# Patient Record
Sex: MALE | Race: BLACK OR AFRICAN AMERICAN | NOT HISPANIC OR LATINO | Employment: UNEMPLOYED | URBAN - METROPOLITAN AREA
[De-identification: names, ages, dates, MRNs, and addresses within clinical notes are randomized per-mention and may not be internally consistent; named-entity substitution may affect disease eponyms.]

---

## 2022-09-15 ENCOUNTER — OFFICE VISIT (OUTPATIENT)
Dept: PODIATRY | Facility: CLINIC | Age: 62
End: 2022-09-15
Payer: OTHER GOVERNMENT

## 2022-09-15 VITALS
HEART RATE: 73 BPM | SYSTOLIC BLOOD PRESSURE: 129 MMHG | WEIGHT: 237 LBS | DIASTOLIC BLOOD PRESSURE: 83 MMHG | HEIGHT: 70 IN | BODY MASS INDEX: 33.93 KG/M2

## 2022-09-15 DIAGNOSIS — M79.671 RIGHT FOOT PAIN: ICD-10-CM

## 2022-09-15 DIAGNOSIS — M72.2 PLANTAR FASCIITIS: Primary | ICD-10-CM

## 2022-09-15 DIAGNOSIS — M76.61 ACHILLES TENDINITIS OF RIGHT LOWER EXTREMITY: ICD-10-CM

## 2022-09-15 PROCEDURE — 99203 OFFICE O/P NEW LOW 30 MIN: CPT | Mod: S$GLB,,, | Performed by: PODIATRIST

## 2022-09-15 PROCEDURE — 99203 PR OFFICE/OUTPT VISIT, NEW, LEVL III, 30-44 MIN: ICD-10-PCS | Mod: S$GLB,,, | Performed by: PODIATRIST

## 2022-09-15 RX ORDER — SERTRALINE HYDROCHLORIDE 100 MG/1
150 TABLET, FILM COATED ORAL
COMMUNITY
Start: 2022-08-22

## 2022-09-15 RX ORDER — ATORVASTATIN CALCIUM 20 MG/1
10 TABLET, FILM COATED ORAL
COMMUNITY
Start: 2022-08-22

## 2022-09-15 RX ORDER — AMLODIPINE BESYLATE 10 MG/1
10 TABLET ORAL
COMMUNITY
Start: 2022-08-22

## 2022-09-15 RX ORDER — CHOLECALCIFEROL (VITAMIN D3) 25 MCG
25 TABLET ORAL
COMMUNITY
Start: 2022-08-22

## 2022-09-15 RX ORDER — POTASSIUM CITRATE 5 MEQ/1
10 TABLET, EXTENDED RELEASE ORAL
COMMUNITY
Start: 2022-08-22

## 2022-09-15 RX ORDER — CHLORHEXIDINE GLUCONATE ORAL RINSE 1.2 MG/ML
SOLUTION DENTAL
COMMUNITY
Start: 2022-01-03

## 2022-09-15 RX ORDER — SILDENAFIL 100 MG/1
100 TABLET, FILM COATED ORAL
COMMUNITY
Start: 2022-08-22

## 2022-09-15 RX ORDER — HYDROCHLOROTHIAZIDE 12.5 MG/1
2 CAPSULE ORAL DAILY
COMMUNITY
Start: 2022-08-22

## 2022-09-15 RX ORDER — ALLOPURINOL 100 MG/1
1 TABLET ORAL DAILY
COMMUNITY
Start: 2022-08-22

## 2022-09-15 RX ORDER — PRAZOSIN HYDROCHLORIDE 2 MG/1
4 CAPSULE ORAL
COMMUNITY
Start: 2022-08-22

## 2022-09-21 NOTE — PROGRESS NOTES
Subjective:      Patient ID: Oscar Last is a 62 y.o. male.    Chief Complaint: Ankle Pain and Foot Pain    Oscar is a 62 y.o. male who presents to the clinic complaining of heel pain in the right foot, especially with the first step in the morning. The pain is described as Throbbing, Grabbing, Tight, and Sharp. The onset of the pain was sudden and has slightly improved over the past several months. Oscar rates the pain as 2/10. He reports a history of trauma.  Patient states pain began about 6 8 months ago after jumping off the back of a pickup truck.  Prior treatments include rest and PTC analgesics.    Review of Systems   Constitutional: Negative for chills and fever.   Cardiovascular:  Negative for chest pain and leg swelling.   Respiratory:  Negative for cough and shortness of breath.    Gastrointestinal:  Negative for diarrhea, nausea and vomiting.         Objective:      Physical Exam  Vitals reviewed.   Constitutional:       General: He is not in acute distress.     Appearance: Normal appearance. He is not ill-appearing.   HENT:      Head: Normocephalic.   Cardiovascular:      Rate and Rhythm: Normal rate.   Pulmonary:      Effort: Pulmonary effort is normal. No respiratory distress.   Skin:     Capillary Refill: Capillary refill takes 2 to 3 seconds.   Neurological:      Mental Status: He is alert and oriented to person, place, and time.   Psychiatric:         Mood and Affect: Mood normal.         Behavior: Behavior normal.     Neurologic:  Protective light touch sensation intact bilateral lower extremity   Dermatologic:  Nails 1 through 5 within normal limits of thickness, no open lesions noted bilateral foot, no rashes noted bilateral foot   Vascular:  DP and PT pulses palpable 2/4 bilateral foot, pedal hair growth is present, skin temperature gradient within normal limits proximal to distal bilateral foot   Musculoskeletal:  5/5 muscle strength noted bilateral foot, ankle joint range of motion is  reduced on the right foot with minimal tenderness with palpation of the posterior calcaneus or plantar calcaneus.  No masses noted bilateral foot        Assessment:       Encounter Diagnoses   Name Primary?    Plantar fasciitis Yes    Achilles tendinitis of right lower extremity     Right foot pain          Plan:       Oscar was seen today for ankle pain and foot pain.    Diagnoses and all orders for this visit:    Plantar fasciitis    Achilles tendinitis of right lower extremity    Right foot pain      I counseled the patient on his conditions, their implications and medical management.        1. Patient was examined and evaluated.    2. Discussed with patient etiology of heel pain including plantar fasciitis and Achilles tendinitis.  Patient made aware he could have injured these areas from jumping off the truck.  Patient was shown proper stretching and icing for both conditions.  Stretching exercises were then printed and dispensed to the home reference.  Patient was advised to adjunct with OTC analgesics as necessary.  Patient was advised to alter shoe gear for better comfort and fit   3. Patient will follow-up p.r.n. for foot complaints